# Patient Record
Sex: MALE | Race: WHITE | NOT HISPANIC OR LATINO | Employment: OTHER | URBAN - METROPOLITAN AREA
[De-identification: names, ages, dates, MRNs, and addresses within clinical notes are randomized per-mention and may not be internally consistent; named-entity substitution may affect disease eponyms.]

---

## 2019-12-22 ENCOUNTER — HOSPITAL ENCOUNTER (EMERGENCY)
Facility: HOSPITAL | Age: 44
Discharge: HOME | End: 2019-12-22
Attending: EMERGENCY MEDICINE
Payer: MEDICARE

## 2019-12-22 VITALS
OXYGEN SATURATION: 100 % | BODY MASS INDEX: 38.54 KG/M2 | SYSTOLIC BLOOD PRESSURE: 167 MMHG | HEART RATE: 68 BPM | WEIGHT: 310 LBS | DIASTOLIC BLOOD PRESSURE: 88 MMHG | TEMPERATURE: 98 F | RESPIRATION RATE: 20 BRPM | HEIGHT: 75 IN

## 2019-12-22 DIAGNOSIS — Z72.0 SNUFF USER: ICD-10-CM

## 2019-12-22 DIAGNOSIS — M25.572 ACUTE LEFT ANKLE PAIN: Primary | ICD-10-CM

## 2019-12-22 DIAGNOSIS — I10 ESSENTIAL HYPERTENSION: ICD-10-CM

## 2019-12-22 PROCEDURE — 25000003 PHARM REV CODE 250: Performed by: EMERGENCY MEDICINE

## 2019-12-22 PROCEDURE — 63600175 PHARM REV CODE 636 W HCPCS: Performed by: EMERGENCY MEDICINE

## 2019-12-22 PROCEDURE — 99284 EMERGENCY DEPT VISIT MOD MDM: CPT

## 2019-12-22 PROCEDURE — 29515 APPLICATION SHORT LEG SPLINT: CPT

## 2019-12-22 RX ORDER — METHYLPREDNISOLONE 4 MG/1
TABLET ORAL
Qty: 1 PACKAGE | Refills: 0 | Status: SHIPPED | OUTPATIENT
Start: 2019-12-22 | End: 2020-01-12

## 2019-12-22 RX ORDER — CHLORZOXAZONE 500 MG/1
500 TABLET ORAL
COMMUNITY
Start: 2019-01-07

## 2019-12-22 RX ORDER — PREDNISONE 20 MG/1
60 TABLET ORAL
Status: COMPLETED | OUTPATIENT
Start: 2019-12-22 | End: 2019-12-22

## 2019-12-22 RX ORDER — ROPINIROLE 0.5 MG/1
0.5 TABLET, FILM COATED ORAL
COMMUNITY
Start: 2019-07-05 | End: 2020-07-04

## 2019-12-22 RX ORDER — COLCHICINE 0.6 MG/1
0.6 TABLET ORAL DAILY PRN
Qty: 30 TABLET | Refills: 0 | Status: SHIPPED | OUTPATIENT
Start: 2019-12-22 | End: 2020-01-21

## 2019-12-22 RX ORDER — LISINOPRIL 20 MG/1
TABLET ORAL
COMMUNITY
Start: 2019-10-09

## 2019-12-22 RX ORDER — KETOROLAC TROMETHAMINE 10 MG/1
10 TABLET, FILM COATED ORAL
Status: COMPLETED | OUTPATIENT
Start: 2019-12-22 | End: 2019-12-22

## 2019-12-22 RX ORDER — ZOLPIDEM TARTRATE 10 MG/1
10 TABLET ORAL
COMMUNITY
Start: 2019-12-20

## 2019-12-22 RX ADMIN — KETOROLAC TROMETHAMINE 10 MG: 10 TABLET, FILM COATED ORAL at 06:12

## 2019-12-22 RX ADMIN — PREDNISONE 60 MG: 20 TABLET ORAL at 06:12

## 2019-12-22 NOTE — ED PROVIDER NOTES
SCRIBE #1 NOTE: I, Layla Macias, am scribing for, and in the presence of, Colette Campbell DO. I have scribed the entire note.       History     Chief Complaint   Patient presents with    Ankle Pain     L ankle/ foot pain that started yesterday and worsened this am. hx gout (pt thinks it may be related)     Review of patient's allergies indicates:   Allergen Reactions    Adhesive          History of Present Illness     HPI    12/22/2019, 6:21 AM  History obtained from the patient      History of Present Illness: Oscar Silver is a 44 y.o. male patient with a PMHx of gout, chronic back pain, depression, HTN, vagus nerve stimulator placement, and stroke who presents to the Emergency Department for evaluation of L ankle pain which onset gradually yesterday. Patient says symptoms were mild yesterday but when he stood up this morning he was unable to ambulate secondary to pain. Symptoms are worsening and moderate in severity. Pain exacerbated with ROM of ankle and weight bearing. No mitigating factors reported. No associated sxs. Patient denies any fever, chills, extremity weakness/numbness, HA, dizziness, leg pain/swelling, CP, SOB, palpitations, and all other sxs at this time. Prior Tx includes 2 chlorzoxyzones last night. Patient is under pain management and got 100 oxycodone 10 mg on 12/9/19. No further complaints or concerns at this time.         Arrival mode: Personal vehicle    PCP: Vaughn Pedro MD        Past Medical History:  Past Medical History:   Diagnosis Date    Back pain     Depression     Hypertension     Status post VNS (vagus nerve stimulator) placement     Stroke        Past Surgical History:  Past Surgical History:   Procedure Laterality Date    BACK SURGERY      left knee      NOSE SURGERY      right wrist      TONSILLECTOMY      VAGUS NERVE STIMULATOR INSERTION           Family History:  History reviewed. No pertinent family history.    Social History:  Social History      Tobacco Use    Smoking status: Never Smoker    Smokeless tobacco: Current User     Types: Chew   Substance and Sexual Activity    Alcohol use: No    Drug use: No    Sexual activity: unknown        Review of Systems     Review of Systems   Constitutional: Negative for activity change, chills, diaphoresis and fever.   HENT: Negative for congestion, rhinorrhea, sneezing, sore throat and trouble swallowing.    Eyes: Negative for pain.   Respiratory: Negative for cough, chest tightness, shortness of breath, wheezing and stridor.    Cardiovascular: Negative for chest pain, palpitations and leg swelling.   Gastrointestinal: Negative for abdominal distention, abdominal pain, constipation, diarrhea, nausea and vomiting.   Genitourinary: Negative for difficulty urinating, dysuria, frequency and urgency.   Musculoskeletal: Negative for back pain, myalgias, neck pain and neck stiffness.        (+) L ankle pain  (-) leg pain   Skin: Negative for pallor, rash and wound.   Neurological: Negative for dizziness, syncope, weakness, light-headedness, numbness and headaches.   Hematological: Does not bruise/bleed easily.   All other systems reviewed and are negative.       Physical Exam     Initial Vitals [12/22/19 0533]   BP Pulse Resp Temp SpO2   (!) 160/88 88 20 97.6 °F (36.4 °C) 99 %      MAP       --          Physical Exam  Nursing Notes and Vital Signs Reviewed.  Constitutional: Patient is in no acute distress. Well-developed and well-nourished.  Head: Atraumatic. Normocephalic.  Eyes: PERRL. EOM intact. Conjunctivae are not pale. No scleral icterus.  ENT: Mucous membranes are moist. Oropharynx is clear and symmetric.    Neck: Supple. Full ROM. No lymphadenopathy.  Cardiovascular: Regular rate. Regular rhythm. No murmurs, rubs, or gallops. Distal pulses are 2+ and symmetric.  Pulmonary/Chest: No respiratory distress. Clear to auscultation bilaterally. No wheezing or rales.  Abdominal: Soft and non-distended.  There is no  "tenderness.  No rebound, guarding, or rigidity. Good bowel sounds.  Genitourinary: No CVA tenderness  Musculoskeletal: No obvious deformities. No calf tenderness.   Left  Knee:  No deformity, full range of motion.  No calf pain or calf tenderness  Left Ankle: Mild swelling to lateral malleolus.    No pain to the 5th metatarsal head.  No heel pain.  No redness of the joint.  No deformity.     Left foot:  No deformity, nontender to palpation, no cuboid bone tenderness.Cap refill less than 3 sec.  Dorsalis pedis and tibialis pulses are intact equal bilaterally.  Skin: Warm and dry.  Neurological:  Alert, awake, and appropriate.  Normal speech.  No acute focal neurological deficits are appreciated.  Psychiatric: Normal affect. Good eye contact. Appropriate in content.     ED Course   Splint Application  Date/Time: 2019 6:54 AM  Performed by: Colette Campbell DO  Authorized by: Colette Campbell DO   Consent Done: Yes  Consent: Verbal consent obtained.  Risks and benefits: risks, benefits and alternatives were discussed  Consent given by: patient  Patient identity confirmed: , verbally with patient and name  Location details: left ankle  Splint type: ace wrap.  Post-procedure: The splinted body part was neurovascularly unchanged following the procedure.  Patient tolerance: Patient tolerated the procedure well with no immediate complications        ED Vital Signs:  Vitals:    19 0533   BP: (!) 160/88   Pulse: 88   Resp: 20   Temp: 97.6 °F (36.4 °C)   TempSrc: Oral   SpO2: 99%   Weight: (!) 140.6 kg (310 lb)   Height: 6' 2.5" (1.892 m)              The Emergency Provider reviewed the vital signs and test results, which are outlined above.     ED Discussion     6:25 AM: Pt declined X-Ray. Discussed with pt risks and benefits of steroids. Answered all questions. Pt expresses understanding at this time.    7:12 AM: Reassessed pt at this time. Discussed with pt all pertinent ED information. Discussed pt dx " and plan of tx. Gave pt all f/u and return to the ED instructions. All questions and concerns were addressed at this time. Pt expresses understanding of information and instructions, and is comfortable with plan to discharge. Pt is stable for discharge.    I discussed with patient that evaluation in the ED does not suggest any emergent or life threatening medical conditions requiring immediate intervention beyond what was provided in the ED, and I believe patient is safe for discharge.  Regardless, an unremarkable evaluation in the ED does not preclude the development or presence of a serious of life threatening condition. As such, patient was instructed to return immediately for any worsening or change in current symptoms.                 ED Medication(s):  Medications   predniSONE tablet 60 mg (60 mg Oral Given 12/22/19 0634)   ketorolac tablet 10 mg (10 mg Oral Given 12/22/19 0633)       New Prescriptions    COLCHICINE (COLCRYS) 0.6 MG TABLET    Take 1 tablet (0.6 mg total) by mouth daily as needed.    METHYLPREDNISOLONE (MEDROL DOSEPACK) 4 MG TABLET    As directed       Follow-up Information     Vaughn Pedro MD In 2 days.    Specialty:  Family Medicine  Why:  return to the emergency department for:   calf pain, calf swelling, redness of the ankle, chest pain, chest pressure, or other concerns.  Contact information:  [facility]  [address]  [facility] [address] [phone]                       Scribe Attestation:   Scribe #1: I performed the above scribed service and the documentation accurately describes the services I performed. I attest to the accuracy of the note.     Attending:   Physician Attestation Statement for Scribe #1: I, Colette Campbell DO, personally performed the services described in this documentation, as scribed by Layla Macias, in my presence, and it is both accurate and complete.           Clinical Impression       ICD-10-CM ICD-9-CM   1. Acute left ankle pain M25.572 719.47    2. Snuff user Z78.9 305.1   3. Essential hypertension I10 401.9       Disposition:   Disposition: Discharged  Condition: Stable         Colette Campbell,   12/22/19 0927

## 2020-07-05 ENCOUNTER — HOSPITAL ENCOUNTER (EMERGENCY)
Facility: HOSPITAL | Age: 45
Discharge: HOME | End: 2020-07-05
Attending: EMERGENCY MEDICINE
Payer: MEDICARE

## 2020-07-05 VITALS
TEMPERATURE: 99 F | OXYGEN SATURATION: 99 % | BODY MASS INDEX: 40.43 KG/M2 | HEIGHT: 74 IN | HEART RATE: 84 BPM | WEIGHT: 315 LBS | SYSTOLIC BLOOD PRESSURE: 129 MMHG | DIASTOLIC BLOOD PRESSURE: 80 MMHG | RESPIRATION RATE: 18 BRPM

## 2020-07-05 DIAGNOSIS — M54.31 SCIATICA, RIGHT SIDE: Primary | ICD-10-CM

## 2020-07-05 PROCEDURE — 63600175 PHARM REV CODE 636 W HCPCS: Performed by: NURSE PRACTITIONER

## 2020-07-05 PROCEDURE — 25000003 PHARM REV CODE 250: Performed by: NURSE PRACTITIONER

## 2020-07-05 PROCEDURE — 96372 THER/PROPH/DIAG INJ SC/IM: CPT | Mod: 59

## 2020-07-05 PROCEDURE — 99284 EMERGENCY DEPT VISIT MOD MDM: CPT | Mod: 25

## 2020-07-05 RX ORDER — ORPHENADRINE CITRATE 30 MG/ML
60 INJECTION INTRAMUSCULAR; INTRAVENOUS
Status: COMPLETED | OUTPATIENT
Start: 2020-07-05 | End: 2020-07-05

## 2020-07-05 RX ORDER — MORPHINE SULFATE 4 MG/ML
6 INJECTION, SOLUTION INTRAMUSCULAR; INTRAVENOUS
Status: COMPLETED | OUTPATIENT
Start: 2020-07-05 | End: 2020-07-05

## 2020-07-05 RX ORDER — DEXAMETHASONE SODIUM PHOSPHATE 4 MG/ML
8 INJECTION, SOLUTION INTRA-ARTICULAR; INTRALESIONAL; INTRAMUSCULAR; INTRAVENOUS; SOFT TISSUE
Status: COMPLETED | OUTPATIENT
Start: 2020-07-05 | End: 2020-07-05

## 2020-07-05 RX ORDER — ONDANSETRON 8 MG/1
8 TABLET, ORALLY DISINTEGRATING ORAL
Status: COMPLETED | OUTPATIENT
Start: 2020-07-05 | End: 2020-07-05

## 2020-07-05 RX ORDER — PREDNISONE 50 MG/1
50 TABLET ORAL DAILY
Qty: 5 TABLET | Refills: 0 | Status: SHIPPED | OUTPATIENT
Start: 2020-07-05 | End: 2020-07-10

## 2020-07-05 RX ADMIN — ORPHENADRINE CITRATE 60 MG: 30 INJECTION INTRAMUSCULAR; INTRAVENOUS at 10:07

## 2020-07-05 RX ADMIN — MORPHINE SULFATE 6 MG: 4 INJECTION INTRAVENOUS at 10:07

## 2020-07-05 RX ADMIN — ONDANSETRON 8 MG: 8 TABLET, ORALLY DISINTEGRATING ORAL at 10:07

## 2020-07-05 RX ADMIN — DEXAMETHASONE SODIUM PHOSPHATE 8 MG: 4 INJECTION, SOLUTION INTRA-ARTICULAR; INTRALESIONAL; INTRAMUSCULAR; INTRAVENOUS; SOFT TISSUE at 10:07

## 2020-07-05 NOTE — ED NOTES
Patient identifiers verified and correct for Oscar Silver.    LOC: The patient is awake, alert and aware of environment with an appropriate affect, the patient is oriented x 3 and speaking appropriately.  APPEARANCE: Patient resting comfortably and in no acute distress, patient is clean and well groomed, patient's clothing is properly fastened.  SKIN: The skin is warm and dry, color consistent with ethnicity, patient has normal skin turgor and moist mucus membranes, skin intact, no breakdown or bruising noted.  MUSCULOSKELETAL: Patient moving all extremities spontaneously. Pt c/o lower back pain, states he woke up this morning and couldn't walk. Has spinal cord stimulator.   RESPIRATORY: Airway is open and patent, respirations are spontaneous.  CARDIAC: Patient has a normal rate, no periphreal edema noted, capillary refill < 3 seconds.  ABDOMEN: Soft and non tender to palpation.

## 2020-07-05 NOTE — ED PROVIDER NOTES
Encounter Date: 7/5/2020       History     Chief Complaint   Patient presents with    Back Pain     Woke up this morning couldn't walk, states he has a spinal cord stimulator     44 year old male with complaint of right lower back pain with radiation into right leg chronically with worsening X 2 days. No fall. No trauma. No fever or chills. Pt in pain management.  Worse with any movement and walking.  Constant aching pain. No relief with Percocet.         Review of patient's allergies indicates:   Allergen Reactions    Adhesive      Past Medical History:   Diagnosis Date    Back pain     Depression     Hypertension     Status post VNS (vagus nerve stimulator) placement     Stroke      Past Surgical History:   Procedure Laterality Date    BACK SURGERY      left knee      NOSE SURGERY      right wrist      TONSILLECTOMY      VAGUS NERVE STIMULATOR INSERTION       History reviewed. No pertinent family history.  Social History     Tobacco Use    Smoking status: Never Smoker    Smokeless tobacco: Current User     Types: Chew   Substance Use Topics    Alcohol use: No    Drug use: No     Review of Systems   Constitutional: Negative for fever.   HENT: Negative for sore throat.    Respiratory: Negative for shortness of breath.    Cardiovascular: Negative for chest pain.   Gastrointestinal: Negative for nausea.   Genitourinary: Negative for dysuria.   Musculoskeletal: Positive for back pain.   Skin: Negative for rash.   Neurological: Negative for weakness.   Hematological: Does not bruise/bleed easily.       Physical Exam     Initial Vitals [07/05/20 0944]   BP Pulse Resp Temp SpO2   134/83 90 18 98.2 °F (36.8 °C) 97 %      MAP       --         Physical Exam    Nursing note and vitals reviewed.  Constitutional: He appears well-developed and well-nourished.   HENT:   Head: Normocephalic and atraumatic.   Eyes: Conjunctivae are normal. Pupils are equal, round, and reactive to light.   Neck: Normal range of  motion. Neck supple.   Cardiovascular: Normal rate, regular rhythm, normal heart sounds and intact distal pulses.   Pulmonary/Chest: Breath sounds normal.   Abdominal: Soft. There is no rebound and no guarding.   Musculoskeletal: Normal range of motion.      Comments: Pain with ROM of lumbar spine, right straight leg negative, ambulatory but with pain    Neurological: He is alert.   Skin: Skin is warm and dry.   Psychiatric: He has a normal mood and affect. His behavior is normal. Thought content normal.         ED Course   Procedures  Labs Reviewed   HIV 1 / 2 ANTIBODY          Imaging Results    None                                          Clinical Impression:       ICD-10-CM ICD-9-CM   1. Sciatica, right side  M54.31 724.3             ED Disposition Condition    Discharge Stable        ED Prescriptions     Medication Sig Dispense Start Date End Date Auth. Provider    predniSONE (DELTASONE) 50 MG Tab Take 1 tablet (50 mg total) by mouth once daily. for 5 days 5 tablet 7/5/2020 7/10/2020 Noble Ching NP        Follow-up Information     Follow up With Specialties Details Why Contact Info    Vaughn Pedro MD Family Medicine Schedule an appointment as soon as possible for a visit in 2 days  [address] [facility]  [address]  [place] [address]  [phone]                                       Noble Ching NP  07/05/20 5527

## 2025-05-31 ENCOUNTER — HOSPITAL ENCOUNTER (EMERGENCY)
Facility: HOSPITAL | Age: 50
Discharge: HOME | End: 2025-05-31
Attending: EMERGENCY MEDICINE
Payer: MEDICARE

## 2025-05-31 VITALS
TEMPERATURE: 98 F | BODY MASS INDEX: 32.06 KG/M2 | RESPIRATION RATE: 16 BRPM | DIASTOLIC BLOOD PRESSURE: 84 MMHG | WEIGHT: 257.81 LBS | HEART RATE: 93 BPM | SYSTOLIC BLOOD PRESSURE: 135 MMHG | HEIGHT: 75 IN | OXYGEN SATURATION: 98 %

## 2025-05-31 DIAGNOSIS — K42.0 INCARCERATED UMBILICAL HERNIA: Primary | ICD-10-CM

## 2025-05-31 LAB
ABSOLUTE EOSINOPHIL (OHS): 0.28 K/UL
ABSOLUTE MONOCYTE (OHS): 0.71 K/UL (ref 0.3–1)
ABSOLUTE NEUTROPHIL COUNT (OHS): 5.71 K/UL (ref 1.8–7.7)
ALBUMIN SERPL BCP-MCNC: 4 G/DL (ref 3.5–5.2)
ALP SERPL-CCNC: 68 UNIT/L (ref 40–150)
ALT SERPL W/O P-5'-P-CCNC: 17 UNIT/L (ref 10–44)
ANION GAP (OHS): 8 MMOL/L (ref 8–16)
AST SERPL-CCNC: 15 UNIT/L (ref 11–45)
BASOPHILS # BLD AUTO: 0.04 K/UL
BASOPHILS NFR BLD AUTO: 0.5 %
BILIRUB SERPL-MCNC: 0.9 MG/DL (ref 0.1–1)
BILIRUB UR QL STRIP.AUTO: NEGATIVE
BUN SERPL-MCNC: 9 MG/DL (ref 6–20)
CALCIUM SERPL-MCNC: 8.8 MG/DL (ref 8.7–10.5)
CHLORIDE SERPL-SCNC: 104 MMOL/L (ref 95–110)
CLARITY UR: CLEAR
CO2 SERPL-SCNC: 25 MMOL/L (ref 23–29)
COLOR UR AUTO: COLORLESS
CREAT SERPL-MCNC: 0.9 MG/DL (ref 0.5–1.4)
ERYTHROCYTE [DISTWIDTH] IN BLOOD BY AUTOMATED COUNT: 12.6 % (ref 11.5–14.5)
GFR SERPLBLD CREATININE-BSD FMLA CKD-EPI: >60 ML/MIN/1.73/M2
GLUCOSE SERPL-MCNC: 89 MG/DL (ref 70–110)
GLUCOSE UR QL STRIP: NEGATIVE
HCT VFR BLD AUTO: 50.7 % (ref 40–54)
HCV AB SERPL QL IA: NEGATIVE
HGB BLD-MCNC: 17.4 GM/DL (ref 14–18)
HGB UR QL STRIP: NEGATIVE
HIV 1+2 AB+HIV1 P24 AG SERPL QL IA: NEGATIVE
HOLD SPECIMEN: NORMAL
HOLD SPECIMEN: NORMAL
IMM GRANULOCYTES # BLD AUTO: 0.03 K/UL (ref 0–0.04)
IMM GRANULOCYTES NFR BLD AUTO: 0.4 % (ref 0–0.5)
KETONES UR QL STRIP: NEGATIVE
LEUKOCYTE ESTERASE UR QL STRIP: NEGATIVE
LIPASE SERPL-CCNC: 15 U/L (ref 4–60)
LYMPHOCYTES # BLD AUTO: 1.32 K/UL (ref 1–4.8)
MCH RBC QN AUTO: 30.9 PG (ref 27–31)
MCHC RBC AUTO-ENTMCNC: 34.3 G/DL (ref 32–36)
MCV RBC AUTO: 90 FL (ref 82–98)
NITRITE UR QL STRIP: NEGATIVE
NUCLEATED RBC (/100WBC) (OHS): 0 /100 WBC
PH UR STRIP: 7 [PH]
PLATELET # BLD AUTO: 266 K/UL (ref 150–450)
PMV BLD AUTO: 9.5 FL (ref 9.2–12.9)
POTASSIUM SERPL-SCNC: 3.4 MMOL/L (ref 3.5–5.1)
PROT SERPL-MCNC: 7.1 GM/DL (ref 6–8.4)
PROT UR QL STRIP: NEGATIVE
RBC # BLD AUTO: 5.64 M/UL (ref 4.6–6.2)
RELATIVE EOSINOPHIL (OHS): 3.5 %
RELATIVE LYMPHOCYTE (OHS): 16.3 % (ref 18–48)
RELATIVE MONOCYTE (OHS): 8.8 % (ref 4–15)
RELATIVE NEUTROPHIL (OHS): 70.5 % (ref 38–73)
SODIUM SERPL-SCNC: 137 MMOL/L (ref 136–145)
SP GR UR STRIP: <1.005
UROBILINOGEN UR STRIP-ACNC: NEGATIVE EU/DL
WBC # BLD AUTO: 8.09 K/UL (ref 3.9–12.7)

## 2025-05-31 PROCEDURE — 99285 EMERGENCY DEPT VISIT HI MDM: CPT | Mod: 25

## 2025-05-31 PROCEDURE — 87389 HIV-1 AG W/HIV-1&-2 AB AG IA: CPT | Performed by: EMERGENCY MEDICINE

## 2025-05-31 PROCEDURE — 81003 URINALYSIS AUTO W/O SCOPE: CPT | Performed by: EMERGENCY MEDICINE

## 2025-05-31 PROCEDURE — 85025 COMPLETE CBC W/AUTO DIFF WBC: CPT | Performed by: EMERGENCY MEDICINE

## 2025-05-31 PROCEDURE — 83690 ASSAY OF LIPASE: CPT | Performed by: EMERGENCY MEDICINE

## 2025-05-31 PROCEDURE — 96361 HYDRATE IV INFUSION ADD-ON: CPT

## 2025-05-31 PROCEDURE — 86803 HEPATITIS C AB TEST: CPT | Performed by: EMERGENCY MEDICINE

## 2025-05-31 PROCEDURE — 63600175 PHARM REV CODE 636 W HCPCS: Performed by: EMERGENCY MEDICINE

## 2025-05-31 PROCEDURE — 96374 THER/PROPH/DIAG INJ IV PUSH: CPT

## 2025-05-31 PROCEDURE — 25000003 PHARM REV CODE 250: Performed by: EMERGENCY MEDICINE

## 2025-05-31 PROCEDURE — 96375 TX/PRO/DX INJ NEW DRUG ADDON: CPT

## 2025-05-31 PROCEDURE — 80053 COMPREHEN METABOLIC PANEL: CPT | Performed by: EMERGENCY MEDICINE

## 2025-05-31 RX ORDER — MORPHINE SULFATE 4 MG/ML
8 INJECTION, SOLUTION INTRAMUSCULAR; INTRAVENOUS
Refills: 0 | Status: COMPLETED | OUTPATIENT
Start: 2025-05-31 | End: 2025-05-31

## 2025-05-31 RX ORDER — ONDANSETRON HYDROCHLORIDE 2 MG/ML
4 INJECTION, SOLUTION INTRAVENOUS
Status: COMPLETED | OUTPATIENT
Start: 2025-05-31 | End: 2025-05-31

## 2025-05-31 RX ADMIN — SODIUM CHLORIDE 1000 ML: 9 INJECTION, SOLUTION INTRAVENOUS at 09:05

## 2025-05-31 RX ADMIN — ONDANSETRON 4 MG: 2 INJECTION INTRAMUSCULAR; INTRAVENOUS at 09:05

## 2025-05-31 RX ADMIN — MORPHINE SULFATE 8 MG: 4 INJECTION INTRAVENOUS at 10:05
